# Patient Record
Sex: MALE | Race: WHITE | NOT HISPANIC OR LATINO | Employment: FULL TIME | URBAN - METROPOLITAN AREA
[De-identification: names, ages, dates, MRNs, and addresses within clinical notes are randomized per-mention and may not be internally consistent; named-entity substitution may affect disease eponyms.]

---

## 2019-02-12 ENCOUNTER — TELEPHONE (OUTPATIENT)
Dept: GASTROENTEROLOGY | Facility: CLINIC | Age: 54
End: 2019-02-12

## 2019-02-12 NOTE — TELEPHONE ENCOUNTER
Patient was evaluated by me on November 12, 2018 for colon cancer screening  Colonoscopy revealed a 1 cm cecal mass  Also noted to have a 1 cm polyp in the rectum  CT of the mass showed 3 5 cm masslike density in the cecum which appears to arise from the mucosal surfaces possibly a neoplasm until proven otherwise  There was also some lymph nodes in the retroperitoneum in the mesentery  Patient was referred to Dr Vicki Garcias in colorectal surgery at [facility] and had surgery yesterday  Will follow up pathology results  Also of note, the rectal polyp was tubulovillous and will need a follow-up colonoscopy in 3 years which the patient is already aware of

## 2019-03-13 NOTE — TELEPHONE ENCOUNTER
Dr  Alcario Nelson called requesting to speak with Dr Bazan  I advised she is not in the office today  Dr Aliyah Edmond please call him 3/14/19 at [phone]  Thanks!

## 2020-08-25 ENCOUNTER — TRANSCRIBE ORDERS (OUTPATIENT)
Dept: ADMINISTRATIVE | Facility: HOSPITAL | Age: 55
End: 2020-08-25

## 2020-08-25 DIAGNOSIS — E04.0 NONTOXIC DIFFUSE GOITER: Primary | ICD-10-CM

## 2020-08-27 ENCOUNTER — HOSPITAL ENCOUNTER (OUTPATIENT)
Dept: ULTRASOUND IMAGING | Facility: HOSPITAL | Age: 55
Discharge: HOME | End: 2020-08-27
Payer: COMMERCIAL

## 2020-08-27 DIAGNOSIS — E04.0 NONTOXIC DIFFUSE GOITER: ICD-10-CM

## 2020-08-27 PROCEDURE — 76536 US EXAM OF HEAD AND NECK: CPT

## 2020-10-27 ENCOUNTER — HOSPITAL ENCOUNTER (OUTPATIENT)
Dept: ULTRASOUND IMAGING | Facility: HOSPITAL | Age: 55
Discharge: HOME | End: 2020-10-27
Attending: OTOLARYNGOLOGY
Payer: COMMERCIAL

## 2020-10-27 DIAGNOSIS — E04.2 MULTIPLE THYROID NODULES: ICD-10-CM

## 2020-10-27 PROCEDURE — 88173 CYTOPATH EVAL FNA REPORT: CPT | Performed by: PATHOLOGY

## 2020-10-27 PROCEDURE — 10005 FNA BX W/US GDN 1ST LES: CPT

## 2020-10-27 PROCEDURE — 88172 CYTP DX EVAL FNA 1ST EA SITE: CPT | Performed by: PATHOLOGY

## 2020-10-27 RX ORDER — LIDOCAINE HYDROCHLORIDE 10 MG/ML
5 INJECTION, SOLUTION EPIDURAL; INFILTRATION; INTRACAUDAL; PERINEURAL ONCE
Status: COMPLETED | OUTPATIENT
Start: 2020-10-27 | End: 2020-10-27

## 2020-10-27 RX ADMIN — LIDOCAINE HYDROCHLORIDE 2.5 ML: 10 INJECTION, SOLUTION EPIDURAL; INFILTRATION; INTRACAUDAL; PERINEURAL at 14:40

## 2020-12-03 PROBLEM — E04.1 THYROID NODULE: Chronic | Status: ACTIVE | Noted: 2020-12-03

## 2020-12-17 NOTE — H&P (VIEW-ONLY)
Virtual Regular Visit      Assessment/Plan:    Problem List Items Addressed This Visit        Endocrine    Thyroid nodule - Primary (Chronic)               Reason for visit is   Chief Complaint   Patient presents with    Virtual Regular Visit        Encounter provider Avery Botello MD    Provider located at [facility]  [address]  [place] [address] [phone]      Recent Visits  No visits were found meeting these conditions  Showing recent visits within past 7 days and meeting all other requirements     Today's Visits  Date Type Provider Dept   12/17/20 Telemedicine Avery Botello MD [facility]   Showing today's visits and meeting all other requirements     Future Appointments  No visits were found meeting these conditions  Showing future appointments within next 150 days and meeting all other requirements        The patient was identified by name and date of birth  Merrick Cruz was informed that this is a telemedicine visit and that the visit is being conducted through [facility] and patient was informed that this is a secure, HIPAA-compliant platform  He agrees to proceed     My office door was closed  No one else was in the room  He acknowledged consent and understanding of privacy and security of the video platform  The patient has agreed to participate and understands they can discontinue the visit at any time  Patient is aware this is a billable service  Subjective  Merrick Cruz is a 60 y o  male with a R thyroid nodule   HPI   FNAB - [place] IV  Afirma suspicious    We discussed right thyroidectomy as well as the risks, benefits, and alternatives of surgery    Risks including but are not limited to:    Bleeding, anesthesia, infection, hematoma, airway issues, RNL injury, EBSLN injury, voice changes (pitch, projection, severe hoarseness), swallowing changes, scarring, need for further treatment including radioactive iodine and completion thyroidectomy    R lobe and isthmus with NIMS        Past Medical History:   Diagnosis Date    Hypertension     Tremors of nervous system        Past Surgical History:   Procedure Laterality Date    SMALL INTESTINE SURGERY      US GUIDED THYROID BIOPSY  10/27/2020       Current Outpatient Medications   Medication Sig Dispense Refill    amLODIPine (NORVASC) 10 mg tablet Take by mouth      levETIRAcetam (KEPPRA) 500 mg tablet Take 500 mg by mouth 2 (two) times a day      losartan (COZAAR) 100 MG tablet Take 100 mg by mouth daily       No current facility-administered medications for this visit  No Known Allergies    Review of Systems   10 point review of systems was performed and negative except as above in the history of present illness or otherwise noted in the medical record     Video Exam  Physical Exam   Constitutional: Oriented to person, place, and time  Well-developed and well-nourished, no apparent distress, non-toxic appearance  Cooperative, able to hear and answer questions without difficulty  Voice: Normal voice quality  Psychiatric: Normal mood and affect  Abnormal findings: There were no vitals filed for this visit  Physical Exam     I spent 20 minutes directly with the patient during this visit      VIRTUAL VISIT DISCLAIMER    Kristen Bird acknowledges that he has consented to an online visit or consultation  He understands that the online visit is based solely on information provided by him, and that, in the absence of a face-to-face physical evaluation by the physician, the diagnosis he receives is both limited and provisional in terms of accuracy and completeness  This is not intended to replace a full medical face-to-face evaluation by the physician  Kristen Bird understands and accepts these terms

## 2021-01-07 ENCOUNTER — LAB (OUTPATIENT)
Dept: LAB | Facility: CLINIC | Age: 56
End: 2021-01-07
Payer: COMMERCIAL

## 2021-01-07 ENCOUNTER — HOSPITAL ENCOUNTER (OUTPATIENT)
Dept: NON INVASIVE DIAGNOSTICS | Facility: CLINIC | Age: 56
Discharge: HOME | End: 2021-01-07
Payer: COMMERCIAL

## 2021-01-07 DIAGNOSIS — E04.1 RIGHT THYROID NODULE: ICD-10-CM

## 2021-01-07 LAB
ANION GAP SERPL CALCULATED.3IONS-SCNC: 4 MMOL/L (ref 4–13)
ATRIAL RATE: 68 BPM
BASOPHILS # BLD AUTO: 0.06 THOUSANDS/ΜL (ref 0–0.1)
BASOPHILS NFR BLD AUTO: 1 % (ref 0–1)
BUN SERPL-MCNC: 19 MG/DL (ref 5–25)
CALCIUM SERPL-MCNC: 9.3 MG/DL (ref 8.3–10.1)
CHLORIDE SERPL-SCNC: 107 MMOL/L (ref 100–108)
CO2 SERPL-SCNC: 27 MMOL/L (ref 21–32)
CREAT SERPL-MCNC: 0.96 MG/DL (ref 0.6–1.3)
EOSINOPHIL # BLD AUTO: 0.17 THOUSAND/ΜL (ref 0–0.61)
EOSINOPHIL NFR BLD AUTO: 2 % (ref 0–6)
ERYTHROCYTE [DISTWIDTH] IN BLOOD BY AUTOMATED COUNT: 13.5 % (ref 11.6–15.1)
GFR SERPL CREATININE-BSD FRML MDRD: 87 ML/MIN/1.73SQ M
GLUCOSE P FAST SERPL-MCNC: 85 MG/DL (ref 65–99)
HCT VFR BLD AUTO: 45.6 % (ref 36.5–49.3)
HGB BLD-MCNC: 14.5 G/DL (ref 12–17)
IMM GRANULOCYTES # BLD AUTO: 0.02 THOUSAND/UL (ref 0–0.2)
IMM GRANULOCYTES NFR BLD AUTO: 0 % (ref 0–2)
LYMPHOCYTES # BLD AUTO: 2.03 THOUSANDS/ΜL (ref 0.6–4.47)
LYMPHOCYTES NFR BLD AUTO: 27 % (ref 14–44)
MCH RBC QN AUTO: 26.9 PG (ref 26.8–34.3)
MCHC RBC AUTO-ENTMCNC: 31.8 G/DL (ref 31.4–37.4)
MCV RBC AUTO: 85 FL (ref 82–98)
MONOCYTES # BLD AUTO: 0.7 THOUSAND/ΜL (ref 0.17–1.22)
MONOCYTES NFR BLD AUTO: 9 % (ref 4–12)
NEUTROPHILS # BLD AUTO: 4.58 THOUSANDS/ΜL (ref 1.85–7.62)
NEUTS SEG NFR BLD AUTO: 61 % (ref 43–75)
NRBC BLD AUTO-RTO: 0 /100 WBCS
P AXIS: 56 DEGREES
PLATELET # BLD AUTO: 258 THOUSANDS/UL (ref 149–390)
PMV BLD AUTO: 9.6 FL (ref 8.9–12.7)
POTASSIUM SERPL-SCNC: 4 MMOL/L (ref 3.5–5.3)
PR INTERVAL: 170 MS
QRS AXIS: -27 DEGREES
QRSD INTERVAL: 90 MS
QT INTERVAL: 410 MS
QTC INTERVAL: 435 MS
RBC # BLD AUTO: 5.39 MILLION/UL (ref 3.88–5.62)
SODIUM SERPL-SCNC: 138 MMOL/L (ref 136–145)
T WAVE AXIS: 16 DEGREES
VENTRICULAR RATE: 68 BPM
WBC # BLD AUTO: 7.56 THOUSAND/UL (ref 4.31–10.16)

## 2021-01-07 PROCEDURE — 85025 COMPLETE CBC W/AUTO DIFF WBC: CPT

## 2021-01-07 PROCEDURE — 36415 COLL VENOUS BLD VENIPUNCTURE: CPT

## 2021-01-07 PROCEDURE — 93005 ELECTROCARDIOGRAM TRACING: CPT

## 2021-01-07 PROCEDURE — 80048 BASIC METABOLIC PNL TOTAL CA: CPT

## 2021-01-07 PROCEDURE — 93010 ELECTROCARDIOGRAM REPORT: CPT | Performed by: INTERNAL MEDICINE

## 2021-01-07 RX ORDER — CALCIUM CARBONATE 200(500)MG
1 TABLET,CHEWABLE ORAL DAILY
COMMUNITY

## 2021-01-07 NOTE — PRE-PROCEDURE INSTRUCTIONS
Pre-Surgery Instructions:   Medication Instructions    amLODIPine (NORVASC) 10 mg tablet Instructed patient per Anesthesia Guidelines  Continue, take dos    calcium carbonate (TUMS) 500 mg chewable tablet Instructed patient per Anesthesia Guidelines  Continue, do not take dos    levETIRAcetam (KEPPRA) 500 mg tablet Instructed patient per Anesthesia Guidelines  Continue, take dos    losartan (COZAAR) 100 MG tablet Instructed patient per Anesthesia Guidelines  Continue, do not take Rebtel Index    Med Instructions Troubleshoot   ACE/ARB Med Class    Continue this medication up to the evening before surgery/procedure, but do not take the morning of the day of surgery  Antiepileptic Med Class    Continue to take this medication on your normal schedule  If this is an oral medication and you take it in the morning, then you may take this medicine with a sip of water  Calcium Channel Blocker Med Class    Continue to take this heart medication on your normal schedule  If this is an oral medication and you take it in the morning, then you may take this medicine with a sip of water  You will receive a phone call from hospital for arrival time  Please call surgeons office if any changes in your condition  Wear easy on/off clothing; consider type of surgery;  valuables and jewelry please keep at home  **COVID-19  education done  Please: No contacts or eye make up or artificial eyelashes    Please bring special ordered sling or braces if needed for  Your particular surgery  n/a    Please secure transportation     Follow pre surgery showering or cleaning instructions as  Reviewed by nurse or surgeons office      Questions answered and concerns addressed

## 2021-01-08 ENCOUNTER — ANESTHESIA EVENT (OUTPATIENT)
Dept: PERIOP | Facility: HOSPITAL | Age: 56
End: 2021-01-08
Payer: COMMERCIAL

## 2021-01-14 ENCOUNTER — HOSPITAL ENCOUNTER (OUTPATIENT)
Facility: HOSPITAL | Age: 56
Setting detail: OUTPATIENT SURGERY
Discharge: HOME | End: 2021-01-14
Attending: OTOLARYNGOLOGY | Admitting: OTOLARYNGOLOGY
Payer: COMMERCIAL

## 2021-01-14 ENCOUNTER — ANESTHESIA (OUTPATIENT)
Dept: PERIOP | Facility: HOSPITAL | Age: 56
End: 2021-01-14
Payer: COMMERCIAL

## 2021-01-14 VITALS
DIASTOLIC BLOOD PRESSURE: 89 MMHG | HEIGHT: 70 IN | SYSTOLIC BLOOD PRESSURE: 151 MMHG | OXYGEN SATURATION: 96 % | RESPIRATION RATE: 18 BRPM | TEMPERATURE: 99.2 F | WEIGHT: 181.8 LBS | BODY MASS INDEX: 26.03 KG/M2 | HEART RATE: 86 BPM

## 2021-01-14 VITALS — HEART RATE: 51 BPM

## 2021-01-14 DIAGNOSIS — E04.1 RIGHT THYROID NODULE: ICD-10-CM

## 2021-01-14 DIAGNOSIS — E04.1 THYROID NODULE: Primary | Chronic | ICD-10-CM

## 2021-01-14 PROCEDURE — 60220 PARTIAL REMOVAL OF THYROID: CPT | Performed by: PHYSICIAN ASSISTANT

## 2021-01-14 PROCEDURE — 88342 IMHCHEM/IMCYTCHM 1ST ANTB: CPT | Performed by: PATHOLOGY

## 2021-01-14 PROCEDURE — 88307 TISSUE EXAM BY PATHOLOGIST: CPT | Performed by: PATHOLOGY

## 2021-01-14 PROCEDURE — 60220 PARTIAL REMOVAL OF THYROID: CPT | Performed by: OTOLARYNGOLOGY

## 2021-01-14 RX ORDER — ONDANSETRON 2 MG/ML
INJECTION INTRAMUSCULAR; INTRAVENOUS AS NEEDED
Status: DISCONTINUED | OUTPATIENT
Start: 2021-01-14 | End: 2021-01-14

## 2021-01-14 RX ORDER — HYDROMORPHONE HCL/PF 1 MG/ML
0.5 SYRINGE (ML) INJECTION
Status: DISCONTINUED | OUTPATIENT
Start: 2021-01-14 | End: 2021-01-14 | Stop reason: HOSPADM

## 2021-01-14 RX ORDER — PROPOFOL 10 MG/ML
INJECTION, EMULSION INTRAVENOUS AS NEEDED
Status: DISCONTINUED | OUTPATIENT
Start: 2021-01-14 | End: 2021-01-14

## 2021-01-14 RX ORDER — FENTANYL CITRATE 50 UG/ML
INJECTION, SOLUTION INTRAMUSCULAR; INTRAVENOUS AS NEEDED
Status: DISCONTINUED | OUTPATIENT
Start: 2021-01-14 | End: 2021-01-14

## 2021-01-14 RX ORDER — SODIUM CHLORIDE, SODIUM LACTATE, POTASSIUM CHLORIDE, CALCIUM CHLORIDE 600; 310; 30; 20 MG/100ML; MG/100ML; MG/100ML; MG/100ML
75 INJECTION, SOLUTION INTRAVENOUS CONTINUOUS
Status: DISCONTINUED | OUTPATIENT
Start: 2021-01-14 | End: 2021-01-14 | Stop reason: HOSPADM

## 2021-01-14 RX ORDER — ONDANSETRON 2 MG/ML
4 INJECTION INTRAMUSCULAR; INTRAVENOUS ONCE
Status: DISCONTINUED | OUTPATIENT
Start: 2021-01-14 | End: 2021-01-14 | Stop reason: HOSPADM

## 2021-01-14 RX ORDER — OXYCODONE HYDROCHLORIDE AND ACETAMINOPHEN 5; 325 MG/1; MG/1
1 TABLET ORAL EVERY 4 HOURS PRN
Qty: 10 TABLET | Refills: 0 | Status: SHIPPED | OUTPATIENT
Start: 2021-01-14 | End: 2021-01-24

## 2021-01-14 RX ORDER — SUCCINYLCHOLINE/SOD CL,ISO/PF 100 MG/5ML
SYRINGE (ML) INTRAVENOUS AS NEEDED
Status: DISCONTINUED | OUTPATIENT
Start: 2021-01-14 | End: 2021-01-14

## 2021-01-14 RX ORDER — METOCLOPRAMIDE HYDROCHLORIDE 5 MG/ML
10 INJECTION INTRAMUSCULAR; INTRAVENOUS ONCE
Status: DISCONTINUED | OUTPATIENT
Start: 2021-01-14 | End: 2021-01-14 | Stop reason: HOSPADM

## 2021-01-14 RX ORDER — LIDOCAINE HYDROCHLORIDE AND EPINEPHRINE 10; 10 MG/ML; UG/ML
INJECTION, SOLUTION INFILTRATION; PERINEURAL AS NEEDED
Status: DISCONTINUED | OUTPATIENT
Start: 2021-01-14 | End: 2021-01-14 | Stop reason: HOSPADM

## 2021-01-14 RX ORDER — FENTANYL CITRATE/PF 50 MCG/ML
25 SYRINGE (ML) INJECTION
Status: DISCONTINUED | OUTPATIENT
Start: 2021-01-14 | End: 2021-01-14 | Stop reason: HOSPADM

## 2021-01-14 RX ORDER — EPHEDRINE SULFATE 50 MG/ML
INJECTION INTRAVENOUS AS NEEDED
Status: DISCONTINUED | OUTPATIENT
Start: 2021-01-14 | End: 2021-01-14

## 2021-01-14 RX ORDER — MIDAZOLAM HYDROCHLORIDE 2 MG/2ML
INJECTION, SOLUTION INTRAMUSCULAR; INTRAVENOUS AS NEEDED
Status: DISCONTINUED | OUTPATIENT
Start: 2021-01-14 | End: 2021-01-14

## 2021-01-14 RX ORDER — OXYCODONE HYDROCHLORIDE AND ACETAMINOPHEN 5; 325 MG/1; MG/1
2 TABLET ORAL EVERY 4 HOURS PRN
Status: DISCONTINUED | OUTPATIENT
Start: 2021-01-14 | End: 2021-01-14 | Stop reason: HOSPADM

## 2021-01-14 RX ORDER — MAGNESIUM HYDROXIDE 1200 MG/15ML
LIQUID ORAL AS NEEDED
Status: DISCONTINUED | OUTPATIENT
Start: 2021-01-14 | End: 2021-01-14 | Stop reason: HOSPADM

## 2021-01-14 RX ORDER — DEXAMETHASONE SODIUM PHOSPHATE 4 MG/ML
INJECTION, SOLUTION INTRA-ARTICULAR; INTRALESIONAL; INTRAMUSCULAR; INTRAVENOUS; SOFT TISSUE AS NEEDED
Status: DISCONTINUED | OUTPATIENT
Start: 2021-01-14 | End: 2021-01-14

## 2021-01-14 RX ADMIN — MIDAZOLAM 2 MG: 1 INJECTION INTRAMUSCULAR; INTRAVENOUS at 13:51

## 2021-01-14 RX ADMIN — ONDANSETRON 4 MG: 2 INJECTION INTRAMUSCULAR; INTRAVENOUS at 15:27

## 2021-01-14 RX ADMIN — FENTANYL CITRATE 100 MCG: 50 INJECTION, SOLUTION INTRAMUSCULAR; INTRAVENOUS at 13:56

## 2021-01-14 RX ADMIN — PROPOFOL 50 MG: 10 INJECTION, EMULSION INTRAVENOUS at 15:11

## 2021-01-14 RX ADMIN — PHENYLEPHRINE HYDROCHLORIDE 100 MCG: 10 INJECTION INTRAVENOUS at 14:32

## 2021-01-14 RX ADMIN — FENTANYL CITRATE 25 MCG: 50 INJECTION, SOLUTION INTRAMUSCULAR; INTRAVENOUS at 16:10

## 2021-01-14 RX ADMIN — PHENYLEPHRINE HYDROCHLORIDE 100 MCG: 10 INJECTION INTRAVENOUS at 14:33

## 2021-01-14 RX ADMIN — DEXAMETHASONE SODIUM PHOSPHATE 10 MG: 4 INJECTION, SOLUTION INTRAMUSCULAR; INTRAVENOUS at 14:17

## 2021-01-14 RX ADMIN — PROPOFOL 200 MG: 10 INJECTION, EMULSION INTRAVENOUS at 13:59

## 2021-01-14 RX ADMIN — FENTANYL CITRATE 100 MCG: 50 INJECTION, SOLUTION INTRAMUSCULAR; INTRAVENOUS at 14:10

## 2021-01-14 RX ADMIN — SODIUM CHLORIDE, SODIUM LACTATE, POTASSIUM CHLORIDE, AND CALCIUM CHLORIDE 75 ML/HR: .6; .31; .03; .02 INJECTION, SOLUTION INTRAVENOUS at 12:56

## 2021-01-14 RX ADMIN — FENTANYL CITRATE 50 MCG: 50 INJECTION, SOLUTION INTRAMUSCULAR; INTRAVENOUS at 15:11

## 2021-01-14 RX ADMIN — Medication 100 MG: at 14:02

## 2021-01-14 RX ADMIN — EPHEDRINE SULFATE 10 MG: 50 INJECTION, SOLUTION INTRAVENOUS at 14:36

## 2021-01-14 RX ADMIN — SODIUM CHLORIDE, SODIUM LACTATE, POTASSIUM CHLORIDE, AND CALCIUM CHLORIDE: .6; .31; .03; .02 INJECTION, SOLUTION INTRAVENOUS at 14:30

## 2021-01-14 RX ADMIN — PHENYLEPHRINE HYDROCHLORIDE 50 MCG: 10 INJECTION INTRAVENOUS at 15:20

## 2021-01-14 RX ADMIN — PROPOFOL 70 MG: 10 INJECTION, EMULSION INTRAVENOUS at 14:07

## 2021-01-14 RX ADMIN — EPHEDRINE SULFATE 10 MG: 50 INJECTION, SOLUTION INTRAVENOUS at 14:51

## 2021-01-14 RX ADMIN — FENTANYL CITRATE 25 MCG: 50 INJECTION, SOLUTION INTRAMUSCULAR; INTRAVENOUS at 16:15

## 2021-01-14 RX ADMIN — PHENYLEPHRINE HYDROCHLORIDE 50 MCG: 10 INJECTION INTRAVENOUS at 14:21

## 2021-01-14 NOTE — OP NOTE
OPERATIVE REPORT  PATIENT NAME: Davon Paige    :  1965  MRN: [id number]  Pt Location: [facility] OR ROOM 01    SURGERY DATE: 2021    Surgeon(s) and Role:     * Rubin Zavala MD - Primary     * Elveria Scheuermann, PA-C - Assisting, no qualified resident was available    Preop Diagnosis:  Right thyroid nodule [E04 1]    Post-Op Diagnosis Codes:     * Right thyroid nodule [E04 1]    Procedure(s) (LRB):  RIGHT THYROID LOBECTOMY, ISTHMUSECTOMY WITH NIMS (Right)    Specimen(s):  ID Type Source Tests Collected by Time Destination   1 : Right thyroid lobe and isthmus Tissue Thyroid, Right TISSUE EXAM Rubin Zavala MD 2021 1423        Estimated Blood Loss:   100 mL    Drains:  * No LDAs found *    Anesthesia Type:   General    Operative Indications:  Right thyroid nodule [E04 1]      Operative Findings:  Intact R RLN  Intact R superior para    Complications:   None    Procedure and Technique:  After discussion of risks, benefits, and alternatives,the patient was positively identified in the preop holding area and transferred onto the operating table in the supine position  Appropriate monitoring devices were put in place, anesthesia was induced and the patient was intubated without difficulty  A shoulder roll was placed, and the patients neck was examined  An appropriate site for skin incision was demarcated 1cm below the cricoid cartilage  Before proceeding further, the timeout process was completed  Local anesthesia in the form of 1% lidocaine with 1/100,000 epinephrine was then injected to the marked site  The patients neck was then prepped and draped in the usual sterile fashion  Skin incision was then made at the marked site in a transverse fashion using a 15 blade  Dissection continued through subcutaneous tissues and platysma using the 15 blade    Superficial flaps were then elevated in the subplatysmal plane using Bovie cautery, and these flaps were held in a retracted position using 2-0 silk stitches  Dissection then continued in midline in between strap musculature using DeBakey forceps and Bovie cautery  Strap muscles were then elevated off the underlying thyroid gland on the right side using Harmonic Focus and blunt dissection  The strap muscles were then held in a retracted position using an [employer] [place] retractor  Dissection then continued around the lobe of the thyroid gland, immediately adjacent to the capsule of the gland using bipolar cautery  This dissection was carried out along the superior aspect of the isthmus, extending along the medial aspect of the upper pole  Care was taken to elevate a pyramidal lobe that was encountered, and it was reflected inferiorly to remain in continuity with the isthmus  The superior pole was retracted medially and inferiorly, and dissection continued around the lateral aspect of the lsuperior lobe  The superior pedicle was divided using bipolar cautery  Care was taken to avoid injury to the EBSLN  This allowed further reflection and retraction of the gland medially, and dissection continued inferiorly, with care taken to remain as close as possible to the capsule of the gland to minimize risk of injury or sacrifice of parathyroid glands  With continued dissection, the recurrent laryngeal nerve was identified  The thyroid isthmus was then divided using the bovie  Remaining tissue attachments at [facility] ligament were then divided using Bipolar cautery, with care taken to limit extent of dissection at the point of entry of the recurrent laryngeal nerve  The thyroid lobe and isthmus was removed and examined, and no adherent parathyroid glands were noted  RLN stimulated at 0 3mA  The specimen was then set aside to send to pathology for permanent section evaluation  The surgical site was irrigated with saline, and hemostasis was ensured using Bovie cautery  The surgical site was then closed in multiple layers    Strap muscles were re-approximated across midline using 3-0 Vicryl in a figure of 8 fashion, and the same stitch was used in an interrupted fashion to reapproximate the plastysma and tissue in midline at the same plane  Skin was closed using a 4-0 Monocryl stitch in a running sub-cuticular fashion, followed by a Steristrip  Anesthesia was then reversed, and the patient was awakened, extubated and taken to the recovery room in stable condition  All counts were correct at the end of the case, and no complications were encountered       I was present for the entire procedure    Patient Disposition:  PACU     SIGNATURE: General Naty MD  DATE: January 14, 2021  TIME: 3:51 PM

## 2021-01-14 NOTE — DISCHARGE INSTRUCTIONS
Thyroidectomy   WHAT YOU NEED TO KNOW:   Thyroidectomy is surgery to remove your thyroid gland  Your thyroid is a gland in the front lower part of your neck  The thyroid makes hormones that regulate your metabolism, body temperature, and heart rate  Smaller glands called parathyroids regulate the level of calcium in your blood  You have 4 parathyroids, located on the sides of your thyroid gland  Your parathyroids will not be removed during this surgery  DISCHARGE INSTRUCTIONS:   Medicines: The following medicines have been ordered by your healthcare provider:  · Pain medicine  can help take away or decrease pain  Do not wait until the pain is severe before you take your medicine  Only use the oxycodone/acetaminophen for severe pain, otherwise use just acetaminophen (Tylenol)  Thyroid medicine  Has been prescribed to replace your thyroid hormone  Calcium supplements: It is important to take the calcium and  vitamin D as prescribed  · Take your medicine as directed  Contact your healthcare provider if you think your medicine is not helping or if you have side effects  Tell him or her if you are allergic to any medicine  Keep a list of the medicines, vitamins, and herbs you take  Include the amounts, and when and why you take them  Bring the list or the pill bottles to follow-up visits  Carry your medicine list with you in case of an emergency  Follow up with your endocrinologist or surgeon as directed: You will need to return to have your wound checked and stitches removed  You may also need blood tests to monitor your calcium, parathyroid, and thyroid hormone levels  Write down your questions so you remember to ask them during your visits  Self-care:   · Rest when you need to while you heal after surgery  Slowly start to do more each day  Return to your daily activities as directed  · Wound care:  Carefully wash your skin near the incision wound area with soap and water  Dry the area and put on new, clean bandages as directed  Change your bandages when they get wet or dirty  You can use a mild body lotion to improve the scar  · Swallowing and voice changes: You may have a sore throat, hoarse voice, or difficulty swallowing after surgery  These symptoms should go away after a few days  Contact your endocrinologist or surgeon if:   · You have a fever or chills  · You feel very tired and cold, gain weight for no reason, and your skin is very dry  · You vomit several times in a row  · Your incision is red, swollen, or draining pus  · You have new voice weakness or hoarseness, or it is getting worse  · You have questions or concerns about your condition or care  Seek care immediately or call 911 if:   · You have sudden tingling or muscle cramps in your face, arm, or leg  · You have muscle spasms in your legs and feet that do not go away  · Blood soaks through your bandage  · Your stitches come apart  · You have sudden swelling in your neck or difficulty swallowing  · You have trouble breathing  · You have a seizure  © 2017 [employer]  Information is for End User's use only and may not be sold, redistributed or otherwise used for commercial purposes  All illustrations and images included in CareNotes® are the copyrighted property of [employer]  or Dion Pearce  The above information is an  only  It is not intended as medical advice for individual conditions or treatments  Talk to your doctor, nurse or pharmacist before following any medical regimen to see if it is safe and effective for you  Call Dr Vita Laguna with any questions or concerns: office ; mobile  (urgent issues)

## 2021-01-14 NOTE — INTERVAL H&P NOTE
H&P reviewed  After examining the patient I find no changes in the patients condition since the H&P had been written      Vitals:    01/14/21 1243   BP: 132/84   Pulse: 76   Resp: 18   Temp: 97 6 °F (36 4 °C)   SpO2: 97%

## 2021-01-14 NOTE — ANESTHESIA PREPROCEDURE EVALUATION
Procedure:  RIGHT THYROID LOBECTOMY, ISTHMUSECTOMY WITH NIMS (Right Neck)    Relevant Problems   No relevant active problems        Physical Exam    Airway    Mallampati score: II  TM Distance: >3 FB  Neck ROM: full     Dental   No notable dental hx     Cardiovascular  Rhythm: regular, Rate: normal, Cardiovascular exam normal    Pulmonary  Pulmonary exam normal Breath sounds clear to auscultation,     Other Findings        Anesthesia Plan  ASA Score- 2     Anesthesia Type- general with ASA Monitors  Additional Monitors:   Airway Plan:           Plan Factors-Exercise tolerance (METS): >4 METS  Chart reviewed  EKG reviewed  Existing labs reviewed  Patient summary reviewed  Patient is not a current smoker  Induction- intravenous  Postoperative Plan-     Informed Consent- Anesthetic plan and risks discussed with patient  I personally reviewed this patient with the CRNA  Discussed and agreed on the Anesthesia Plan with the CRNA  Yang Pederson

## 2021-03-19 ENCOUNTER — APPOINTMENT (OUTPATIENT)
Dept: LAB | Facility: CLINIC | Age: 56
End: 2021-03-19
Payer: COMMERCIAL

## 2021-03-19 DIAGNOSIS — E04.1 THYROID NODULE: ICD-10-CM

## 2021-03-19 LAB — TSH SERPL DL<=0.05 MIU/L-ACNC: 3.27 UIU/ML (ref 0.36–3.74)

## 2021-03-19 PROCEDURE — 36415 COLL VENOUS BLD VENIPUNCTURE: CPT

## 2021-03-19 PROCEDURE — 84443 ASSAY THYROID STIM HORMONE: CPT

## 2021-05-12 ENCOUNTER — IMMUNIZATIONS (OUTPATIENT)
Dept: FAMILY MEDICINE CLINIC | Facility: HOSPITAL | Age: 56
End: 2021-05-12

## 2021-05-12 DIAGNOSIS — Z23 ENCOUNTER FOR IMMUNIZATION: Primary | ICD-10-CM

## 2021-05-12 PROCEDURE — 91300 SARS-COV-2 / COVID-19 MRNA VACCINE (PFIZER-BIONTECH) 30 MCG: CPT

## 2021-05-12 PROCEDURE — 0001A SARS-COV-2 / COVID-19 MRNA VACCINE (PFIZER-BIONTECH) 30 MCG: CPT

## 2021-06-08 ENCOUNTER — IMMUNIZATIONS (OUTPATIENT)
Dept: FAMILY MEDICINE CLINIC | Facility: HOSPITAL | Age: 56
End: 2021-06-08

## 2021-06-08 DIAGNOSIS — Z23 ENCOUNTER FOR IMMUNIZATION: Primary | ICD-10-CM

## 2021-06-08 PROCEDURE — 0002A SARS-COV-2 / COVID-19 MRNA VACCINE (PFIZER-BIONTECH) 30 MCG: CPT

## 2021-06-08 PROCEDURE — 91300 SARS-COV-2 / COVID-19 MRNA VACCINE (PFIZER-BIONTECH) 30 MCG: CPT

## (undated) DEVICE — CHLORAPREP HI-LITE 26ML ORANGE

## (undated) DEVICE — LIGACLIP MCA MULTIPLE CLIP APPLIERS, 20 MEDIUM CLIPS: Brand: LIGACLIP

## (undated) DEVICE — SURGICEL FIBRILLAR 1 X 2

## (undated) DEVICE — INTENDED FOR TISSUE SEPARATION, AND OTHER PROCEDURES THAT REQUIRE A SHARP SURGICAL BLADE TO PUNCTURE OR CUT.: Brand: BARD-PARKER SAFETY BLADES SIZE 15, STERILE

## (undated) DEVICE — PROXIMATE PLUS MD MULTI-DIRECTIONAL RELEASE SKIN STAPLERS CONTAINS 35 STAINLESS STEEL STAPLES APPROXIMATE CLOSED DIMENSIONS: 6.9MM X 3.9MM WIDE: Brand: PROXIMATE

## (undated) DEVICE — ELECTRODE BLADE MOD E-Z CLEAN  2.75IN 7CM -0012AM

## (undated) DEVICE — PAD GROUNDING ADULT

## (undated) DEVICE — GLOVE SRG BIOGEL 7.5

## (undated) DEVICE — SUT MONOCRYL 5-0 P-3 18 IN Y493G

## (undated) DEVICE — 3M™ STERI-STRIP™ REINFORCED ADHESIVE SKIN CLOSURES, R1547, 1/2 IN X 4 IN (12 MM X 100 MM), 6 STRIPS/ENVELOPE: Brand: 3M™ STERI-STRIP™

## (undated) DEVICE — PACK UNIVERSAL NECK

## (undated) DEVICE — SUT VICRYL 3-0 SH 27 IN J416H

## (undated) DEVICE — 3M™ STERI-STRIP™ COMPOUND BENZOIN TINCTURE 40 BAGS/CARTON 4 CARTONS/CASE C1544: Brand: 3M™ STERI-STRIP™

## (undated) DEVICE — ROSEBUD DISSECTORS: Brand: DEROYAL

## (undated) DEVICE — TIBURON SPLIT SHEET: Brand: CONVERTORS